# Patient Record
Sex: MALE
[De-identification: names, ages, dates, MRNs, and addresses within clinical notes are randomized per-mention and may not be internally consistent; named-entity substitution may affect disease eponyms.]

---

## 2024-05-13 PROBLEM — Z00.00 ENCOUNTER FOR PREVENTIVE HEALTH EXAMINATION: Status: ACTIVE | Noted: 2024-05-13

## 2024-05-15 ENCOUNTER — APPOINTMENT (OUTPATIENT)
Dept: UROLOGY | Facility: CLINIC | Age: 69
End: 2024-05-15
Payer: MEDICARE

## 2024-05-15 VITALS
SYSTOLIC BLOOD PRESSURE: 120 MMHG | TEMPERATURE: 97.7 F | WEIGHT: 174 LBS | HEART RATE: 77 BPM | BODY MASS INDEX: 27.97 KG/M2 | HEIGHT: 66 IN | OXYGEN SATURATION: 96 % | DIASTOLIC BLOOD PRESSURE: 82 MMHG

## 2024-05-15 DIAGNOSIS — R39.9 UNSPECIFIED SYMPTOMS AND SIGNS INVOLVING THE GENITOURINARY SYSTEM: ICD-10-CM

## 2024-05-15 DIAGNOSIS — F17.210 NICOTINE DEPENDENCE, CIGARETTES, UNCOMPLICATED: ICD-10-CM

## 2024-05-15 DIAGNOSIS — R97.20 ELEVATED PROSTATE, SPECIFIC ANTIGEN [PSA]: ICD-10-CM

## 2024-05-15 DIAGNOSIS — Z12.5 ENCOUNTER FOR SCREENING FOR MALIGNANT NEOPLASM OF PROSTATE: ICD-10-CM

## 2024-05-15 DIAGNOSIS — Z72.0 TOBACCO USE: ICD-10-CM

## 2024-05-15 PROCEDURE — 99204 OFFICE O/P NEW MOD 45 MIN: CPT | Mod: 25

## 2024-05-15 PROCEDURE — 51798 US URINE CAPACITY MEASURE: CPT

## 2024-05-15 PROCEDURE — 51741 ELECTRO-UROFLOWMETRY FIRST: CPT

## 2024-05-15 RX ORDER — ATORVASTATIN CALCIUM 20 MG/1
20 TABLET, FILM COATED ORAL
Refills: 0 | Status: ACTIVE | COMMUNITY

## 2024-05-15 NOTE — ASSESSMENT
[FreeTextEntry1] : 68M with LUTS.    LUTS -UA/Ucx today -discussed assessing for microscopic hematuria in light of his smoking history -Smoking cessation discussed with the patient -Discussed behavior modifications such as decreasing caffeine intake and decreasing other bladder irritants -Discussed medical therapy as well including alpha blockers -Patient wishes to switch to decaf coffee for a week and see how symptoms change and start tamsulosin  -Rx for tamsulosin sent - The patient understands that this medication may cause dizziness, retrograde ejaculation or nasal congestion among other complications. I recommended that the patient take this medication at night. Pt was instructed to not take this medication at the same time as ant-hypertensive medications or PDE5i. If the side effects become too bothersome, the medication can be discontinued.  Prostate cancer screening -PSA today     Edita Livingston MD Chief of Urology, [address], [place] [address] [place] [address] Phone: [phone] Fax: [phone]

## 2024-05-15 NOTE — PHYSICAL EXAM
[General Appearance - Well Developed] : well developed [General Appearance - Well Nourished] : well nourished [Normal Appearance] : normal appearance [Well Groomed] : well groomed [General Appearance - In No Acute Distress] : no acute distress [Edema] : no peripheral edema [Respiration, Rhythm And Depth] : normal respiratory rhythm and effort [Exaggerated Use Of Accessory Muscles For Inspiration] : no accessory muscle use [Abdomen Soft] : soft [Abdomen Tenderness] : non-tender [Costovertebral Angle Tenderness] : no ~M costovertebral angle tenderness [Urinary Bladder Findings] : the bladder was normal on palpation [Normal Station and Gait] : the gait and station were normal for the patient's age [Skin Color & Pigmentation] : normal skin color and pigmentation [] : no rash [No Focal Deficits] : no focal deficits [Oriented To Time, Place, And Person] : oriented to person, place, and time [Affect] : the affect was normal [Mood] : the mood was normal [Not Anxious] : not anxious [No Palpable Adenopathy] : no palpable adenopathy

## 2024-05-15 NOTE — REVIEW OF SYSTEMS
[see HPI] : see HPI [Negative] : Heme/Lymph [Urine retention] : urine retention [Wake up at night to urinate  How many times?  ___] : wakes up to urinate [unfilled] times during the night [Strong urge to urinate] : strong urge to urinate

## 2024-05-15 NOTE — HISTORY OF PRESENT ILLNESS
[FreeTextEntry1] : Referring Provider: YESENIA Saldivar ([facility]) Chief Complaint: Prostate cancer screening and LUTS Date of first visit: 05/15/2024   ALEXI TEJADA is a 68 year old  man with a PMHx of HLD who presents for evaluation of LUTS as well as prostate cancer screening.  Patient states that he has nocturia x 3 and has bothersome urgency.  He denies any straining and feels that usually he completely empties his bladder.  He denies any gross hematuria or history of nephrolithiasis.  He does drink 2 cups of regular coffee a day and occasionally seltzer.  He drinks alcohol 3 times per week.  He has not tried any medication for his urinary symptoms at this time.  The patient is prostate MRI and biopsy naive.  He is unsure of family urologic history.      PSA Hx: none on file  Biopsy Hx: n/a MRI Hx: N/a   05/15/2024  IPSS 12 QOL 6 BRO declined to fill out  Flow/PVR: qmax = 20.0 ml/s, avg = 5.5 ml/s, VV = 182 ml, PVR = 30 ml    PMHx: HLD SxHx: None FHx: Unknown to patient SocHx: Current smoker (8 cigarettes/day x 30 years), alcohol 3 times per week Allergies: NKDA     The patient denies fevers, chills, nausea and or vomiting and no unexplained weight loss. All pertinent laboratory, films and physician notes were reviewed.  Questionnaire results were discussed with patient.

## 2024-05-16 PROBLEM — R97.20 ELEVATED PSA: Status: ACTIVE | Noted: 2024-05-16

## 2024-05-16 LAB
APPEARANCE: CLEAR
BACTERIA: NEGATIVE /HPF
BILIRUBIN URINE: NEGATIVE
BLOOD URINE: NEGATIVE
CAST: 0 /LPF
COLOR: YELLOW
EPITHELIAL CELLS: 0 /HPF
GLUCOSE QUALITATIVE U: NEGATIVE MG/DL
KETONES URINE: NEGATIVE MG/DL
LEUKOCYTE ESTERASE URINE: NEGATIVE
MICROSCOPIC-UA: NORMAL
NITRITE URINE: NEGATIVE
PH URINE: 7
PROTEIN URINE: NEGATIVE MG/DL
RED BLOOD CELLS URINE: 0 /HPF
SPECIFIC GRAVITY URINE: 1.01
UROBILINOGEN URINE: 0.2 MG/DL
WHITE BLOOD CELLS URINE: 0 /HPF

## 2024-05-17 LAB
BACTERIA UR CULT: NORMAL
PSA FREE FLD-MCNC: 16 %
PSA FREE SERPL-MCNC: 0.95 NG/ML
PSA SERPL-MCNC: 5.99 NG/ML

## 2024-05-23 ENCOUNTER — APPOINTMENT (OUTPATIENT)
Dept: MRI IMAGING | Facility: IMAGING CENTER | Age: 69
End: 2024-05-23

## 2024-06-13 ENCOUNTER — APPOINTMENT (OUTPATIENT)
Dept: UROLOGY | Facility: CLINIC | Age: 69
End: 2024-06-13

## 2024-06-26 ENCOUNTER — TRANSCRIPTION ENCOUNTER (OUTPATIENT)
Age: 69
End: 2024-06-26

## 2024-06-26 ENCOUNTER — RESULT REVIEW (OUTPATIENT)
Age: 69
End: 2024-06-26

## 2024-06-26 ENCOUNTER — APPOINTMENT (OUTPATIENT)
Dept: MRI IMAGING | Facility: IMAGING CENTER | Age: 69
End: 2024-06-26
Payer: MEDICARE

## 2024-06-26 ENCOUNTER — OUTPATIENT (OUTPATIENT)
Dept: OUTPATIENT SERVICES | Facility: HOSPITAL | Age: 69
LOS: 1 days | End: 2024-06-26
Payer: MEDICARE

## 2024-06-26 DIAGNOSIS — R97.20 ELEVATED PROSTATE SPECIFIC ANTIGEN [PSA]: ICD-10-CM

## 2024-06-26 PROCEDURE — 72197 MRI PELVIS W/O & W/DYE: CPT | Mod: 26

## 2024-06-26 PROCEDURE — 72197 MRI PELVIS W/O & W/DYE: CPT

## 2024-06-26 PROCEDURE — 76498P: CUSTOM | Mod: 26

## 2024-06-26 PROCEDURE — 76498 UNLISTED MR PROCEDURE: CPT

## 2024-06-26 PROCEDURE — A9585: CPT

## 2024-07-01 ENCOUNTER — NON-APPOINTMENT (OUTPATIENT)
Age: 69
End: 2024-07-01

## 2024-07-16 ENCOUNTER — APPOINTMENT (OUTPATIENT)
Dept: UROLOGY | Facility: CLINIC | Age: 69
End: 2024-07-16
Payer: MEDICARE

## 2024-07-16 VITALS
DIASTOLIC BLOOD PRESSURE: 78 MMHG | BODY MASS INDEX: 27.97 KG/M2 | SYSTOLIC BLOOD PRESSURE: 140 MMHG | TEMPERATURE: 98.4 F | HEIGHT: 66 IN | RESPIRATION RATE: 15 BRPM | WEIGHT: 174 LBS | OXYGEN SATURATION: 95 % | HEART RATE: 76 BPM

## 2024-07-16 DIAGNOSIS — R97.20 ELEVATED PROSTATE, SPECIFIC ANTIGEN [PSA]: ICD-10-CM

## 2024-07-16 DIAGNOSIS — R39.9 UNSPECIFIED SYMPTOMS AND SIGNS INVOLVING THE GENITOURINARY SYSTEM: ICD-10-CM

## 2024-07-16 PROCEDURE — 99214 OFFICE O/P EST MOD 30 MIN: CPT

## 2024-07-16 RX ORDER — TAMSULOSIN HYDROCHLORIDE 0.4 MG/1
0.4 CAPSULE ORAL
Qty: 30 | Refills: 3 | Status: ACTIVE | COMMUNITY
Start: 2024-07-16 | End: 1900-01-01

## 2024-08-06 ENCOUNTER — APPOINTMENT (OUTPATIENT)
Dept: CARDIOLOGY | Facility: CLINIC | Age: 69
End: 2024-08-06

## 2024-10-15 ENCOUNTER — APPOINTMENT (OUTPATIENT)
Dept: UROLOGY | Facility: CLINIC | Age: 69
End: 2024-10-15